# Patient Record
Sex: MALE | Race: WHITE | Employment: UNEMPLOYED | URBAN - METROPOLITAN AREA
[De-identification: names, ages, dates, MRNs, and addresses within clinical notes are randomized per-mention and may not be internally consistent; named-entity substitution may affect disease eponyms.]

---

## 2017-03-08 ENCOUNTER — OFFICE VISIT (OUTPATIENT)
Dept: ORTHOPEDIC SURGERY | Age: 32
End: 2017-03-08

## 2017-03-08 VITALS — WEIGHT: 180 LBS | BODY MASS INDEX: 24.38 KG/M2 | HEIGHT: 72 IN

## 2017-03-08 DIAGNOSIS — M25.561 RIGHT KNEE PAIN, UNSPECIFIED CHRONICITY: Primary | ICD-10-CM

## 2017-03-08 DIAGNOSIS — S83.91XA SPRAIN OF RIGHT KNEE, UNSPECIFIED LIGAMENT, INITIAL ENCOUNTER: ICD-10-CM

## 2017-03-08 PROCEDURE — 99203 OFFICE O/P NEW LOW 30 MIN: CPT | Performed by: NURSE PRACTITIONER

## 2017-03-08 PROCEDURE — 73560 X-RAY EXAM OF KNEE 1 OR 2: CPT | Performed by: NURSE PRACTITIONER

## 2021-01-21 ENCOUNTER — HOSPITAL ENCOUNTER (EMERGENCY)
Age: 36
Discharge: HOME | End: 2021-01-21
Attending: EMERGENCY MEDICINE
Payer: OTHER GOVERNMENT

## 2021-01-21 ENCOUNTER — APPOINTMENT (OUTPATIENT)
Dept: CT IMAGING | Age: 36
End: 2021-01-21
Payer: OTHER GOVERNMENT

## 2021-01-21 VITALS
BODY MASS INDEX: 25.73 KG/M2 | HEIGHT: 72 IN | OXYGEN SATURATION: 97 % | RESPIRATION RATE: 14 BRPM | SYSTOLIC BLOOD PRESSURE: 138 MMHG | HEART RATE: 88 BPM | WEIGHT: 190 LBS | DIASTOLIC BLOOD PRESSURE: 84 MMHG | TEMPERATURE: 98.4 F

## 2021-01-21 DIAGNOSIS — N13.9 OBSTRUCTIVE UROPATHY: Primary | ICD-10-CM

## 2021-01-21 LAB
ALBUMIN SERPL-MCNC: 4.7 G/DL (ref 3.4–5)
ALP BLD-CCNC: 70 U/L (ref 40–129)
ALT SERPL-CCNC: 23 U/L (ref 10–40)
ANION GAP SERPL CALCULATED.3IONS-SCNC: 15 MMOL/L (ref 3–16)
AST SERPL-CCNC: 26 U/L (ref 15–37)
BASOPHILS ABSOLUTE: 0.1 K/UL (ref 0–0.2)
BASOPHILS RELATIVE PERCENT: 0.4 %
BILIRUB SERPL-MCNC: 0.4 MG/DL (ref 0–1)
BILIRUBIN DIRECT: <0.2 MG/DL (ref 0–0.3)
BILIRUBIN URINE: NEGATIVE
BILIRUBIN, INDIRECT: NORMAL MG/DL (ref 0–1)
BLOOD, URINE: ABNORMAL
BUN BLDV-MCNC: 18 MG/DL (ref 7–20)
CALCIUM SERPL-MCNC: 9.6 MG/DL (ref 8.3–10.6)
CHLORIDE BLD-SCNC: 103 MMOL/L (ref 99–110)
CLARITY: CLEAR
CO2: 20 MMOL/L (ref 21–32)
COLOR: YELLOW
CREAT SERPL-MCNC: 1.2 MG/DL (ref 0.9–1.3)
EOSINOPHILS ABSOLUTE: 0.3 K/UL (ref 0–0.6)
EOSINOPHILS RELATIVE PERCENT: 2.2 %
EPITHELIAL CELLS, UA: 1 /HPF (ref 0–5)
GFR AFRICAN AMERICAN: >60
GFR NON-AFRICAN AMERICAN: >60
GLUCOSE BLD-MCNC: 124 MG/DL (ref 70–99)
GLUCOSE URINE: NEGATIVE MG/DL
HCT VFR BLD CALC: 45.9 % (ref 40.5–52.5)
HEMOGLOBIN: 15.5 G/DL (ref 13.5–17.5)
HYALINE CASTS: 0 /LPF (ref 0–8)
KETONES, URINE: NEGATIVE MG/DL
LEUKOCYTE ESTERASE, URINE: NEGATIVE
LIPASE: 41 U/L (ref 13–60)
LYMPHOCYTES ABSOLUTE: 2.3 K/UL (ref 1–5.1)
LYMPHOCYTES RELATIVE PERCENT: 17.4 %
MCH RBC QN AUTO: 28.6 PG (ref 26–34)
MCHC RBC AUTO-ENTMCNC: 33.8 G/DL (ref 31–36)
MCV RBC AUTO: 84.7 FL (ref 80–100)
MICROSCOPIC EXAMINATION: YES
MONOCYTES ABSOLUTE: 0.8 K/UL (ref 0–1.3)
MONOCYTES RELATIVE PERCENT: 6.3 %
NEUTROPHILS ABSOLUTE: 9.8 K/UL (ref 1.7–7.7)
NEUTROPHILS RELATIVE PERCENT: 73.7 %
NITRITE, URINE: NEGATIVE
PDW BLD-RTO: 13.9 % (ref 12.4–15.4)
PH UA: 7.5 (ref 5–8)
PLATELET # BLD: 214 K/UL (ref 135–450)
PMV BLD AUTO: 9 FL (ref 5–10.5)
POTASSIUM REFLEX MAGNESIUM: 4 MMOL/L (ref 3.5–5.1)
PROTEIN UA: NEGATIVE MG/DL
RBC # BLD: 5.43 M/UL (ref 4.2–5.9)
RBC UA: 83 /HPF (ref 0–4)
SODIUM BLD-SCNC: 138 MMOL/L (ref 136–145)
SPECIFIC GRAVITY UA: 1.02 (ref 1–1.03)
TOTAL PROTEIN: 7.4 G/DL (ref 6.4–8.2)
URINE REFLEX TO CULTURE: ABNORMAL
URINE TYPE: ABNORMAL
UROBILINOGEN, URINE: 0.2 E.U./DL
WBC # BLD: 13.3 K/UL (ref 4–11)
WBC UA: 1 /HPF (ref 0–5)

## 2021-01-21 PROCEDURE — 99285 EMERGENCY DEPT VISIT HI MDM: CPT

## 2021-01-21 PROCEDURE — 74176 CT ABD & PELVIS W/O CONTRAST: CPT

## 2021-01-21 PROCEDURE — 2580000003 HC RX 258: Performed by: EMERGENCY MEDICINE

## 2021-01-21 PROCEDURE — 6370000000 HC RX 637 (ALT 250 FOR IP): Performed by: EMERGENCY MEDICINE

## 2021-01-21 PROCEDURE — 80048 BASIC METABOLIC PNL TOTAL CA: CPT

## 2021-01-21 PROCEDURE — 6360000002 HC RX W HCPCS: Performed by: EMERGENCY MEDICINE

## 2021-01-21 PROCEDURE — 96374 THER/PROPH/DIAG INJ IV PUSH: CPT

## 2021-01-21 PROCEDURE — 83690 ASSAY OF LIPASE: CPT

## 2021-01-21 PROCEDURE — 81001 URINALYSIS AUTO W/SCOPE: CPT

## 2021-01-21 PROCEDURE — 96375 TX/PRO/DX INJ NEW DRUG ADDON: CPT

## 2021-01-21 PROCEDURE — 80076 HEPATIC FUNCTION PANEL: CPT

## 2021-01-21 PROCEDURE — 96376 TX/PRO/DX INJ SAME DRUG ADON: CPT

## 2021-01-21 PROCEDURE — 85025 COMPLETE CBC W/AUTO DIFF WBC: CPT

## 2021-01-21 RX ORDER — MORPHINE SULFATE 2 MG/ML
2 INJECTION, SOLUTION INTRAMUSCULAR; INTRAVENOUS ONCE
Status: COMPLETED | OUTPATIENT
Start: 2021-01-21 | End: 2021-01-21

## 2021-01-21 RX ORDER — 0.9 % SODIUM CHLORIDE 0.9 %
1000 INTRAVENOUS SOLUTION INTRAVENOUS ONCE
Status: COMPLETED | OUTPATIENT
Start: 2021-01-21 | End: 2021-01-21

## 2021-01-21 RX ORDER — TAMSULOSIN HYDROCHLORIDE 0.4 MG/1
0.4 CAPSULE ORAL DAILY
Qty: 10 CAPSULE | Refills: 0 | Status: SHIPPED | OUTPATIENT
Start: 2021-01-21 | End: 2021-01-31

## 2021-01-21 RX ORDER — ONDANSETRON 2 MG/ML
4 INJECTION INTRAMUSCULAR; INTRAVENOUS ONCE
Status: COMPLETED | OUTPATIENT
Start: 2021-01-21 | End: 2021-01-21

## 2021-01-21 RX ORDER — MORPHINE SULFATE 4 MG/ML
4 INJECTION, SOLUTION INTRAMUSCULAR; INTRAVENOUS ONCE
Status: COMPLETED | OUTPATIENT
Start: 2021-01-21 | End: 2021-01-21

## 2021-01-21 RX ORDER — TAMSULOSIN HYDROCHLORIDE 0.4 MG/1
0.4 CAPSULE ORAL ONCE
Status: COMPLETED | OUTPATIENT
Start: 2021-01-21 | End: 2021-01-21

## 2021-01-21 RX ORDER — HYDROCODONE BITARTRATE AND ACETAMINOPHEN 5; 325 MG/1; MG/1
1 TABLET ORAL EVERY 6 HOURS PRN
Qty: 12 TABLET | Refills: 0 | Status: SHIPPED | OUTPATIENT
Start: 2021-01-21 | End: 2021-01-24

## 2021-01-21 RX ADMIN — HYDROMORPHONE HYDROCHLORIDE 0.5 MG: 1 INJECTION, SOLUTION INTRAMUSCULAR; INTRAVENOUS; SUBCUTANEOUS at 04:59

## 2021-01-21 RX ADMIN — TAMSULOSIN HYDROCHLORIDE 0.4 MG: 0.4 CAPSULE ORAL at 06:05

## 2021-01-21 RX ADMIN — ONDANSETRON 4 MG: 2 INJECTION INTRAMUSCULAR; INTRAVENOUS at 04:17

## 2021-01-21 RX ADMIN — SODIUM CHLORIDE 1000 ML: 9 INJECTION, SOLUTION INTRAVENOUS at 04:17

## 2021-01-21 RX ADMIN — MORPHINE SULFATE 4 MG: 4 INJECTION, SOLUTION INTRAMUSCULAR; INTRAVENOUS at 04:17

## 2021-01-21 RX ADMIN — MORPHINE SULFATE 2 MG: 2 INJECTION, SOLUTION INTRAMUSCULAR; INTRAVENOUS at 06:17

## 2021-01-21 ASSESSMENT — ENCOUNTER SYMPTOMS
WHEEZING: 0
COLOR CHANGE: 0
COUGH: 0
STRIDOR: 0
SHORTNESS OF BREATH: 0
VOMITING: 1
EYE ITCHING: 0
PHOTOPHOBIA: 0
DIARRHEA: 0
CONSTIPATION: 0
APNEA: 0
BACK PAIN: 0
CHEST TIGHTNESS: 0
BLOOD IN STOOL: 0
ABDOMINAL DISTENTION: 0
RECTAL PAIN: 0
EYE REDNESS: 0
CHOKING: 0
EYE PAIN: 0
ABDOMINAL PAIN: 1
NAUSEA: 1
ANAL BLEEDING: 0
EYE DISCHARGE: 0

## 2021-01-21 ASSESSMENT — PAIN SCALES - GENERAL
PAINLEVEL_OUTOF10: 8
PAINLEVEL_OUTOF10: 3
PAINLEVEL_OUTOF10: 10

## 2021-01-21 ASSESSMENT — PAIN DESCRIPTION - DESCRIPTORS: DESCRIPTORS: STABBING

## 2021-01-21 ASSESSMENT — PAIN DESCRIPTION - LOCATION: LOCATION: ABDOMEN

## 2021-01-21 NOTE — ED NOTES
Discharge and education instructions reviewed. Patient verbalized understanding, teach-back successful. Patient denied questions at this time. No acute distress noted. Patient instructed to follow-up as noted - return to emergency department if symptoms worsen. Patient verbalized understanding. Discharged per EDMD with discharge instructions.         Anne Graham RN  01/21/21 6732

## 2021-01-21 NOTE — ED PROVIDER NOTES
bruise/bleed easily. Psychiatric/Behavioral: Negative for agitation, behavioral problems, confusion and decreased concentration. Except as noted above the remainder of the review of systems was reviewed and negative. PAST MEDICAL HISTORY     Past Medical History:   Diagnosis Date    Paralysis ([address]     left foot    RSD (reflex sympathetic dystrophy)        SURGICAL HISTORY       Past Surgical History:   Procedure Laterality Date    ANTERIOR CRUCIATE LIGAMENT REPAIR      KNEE CARTILAGE SURGERY         CURRENT MEDICATIONS       Previous Medications    ACETAMINOPHEN (AMINOFEN) 325 MG TABLET    Take 2 tablets by mouth every 6 hours as needed for Pain    ASPIRIN 81 MG TABLET    Take 81 mg by mouth daily    GABAPENTIN (NEURONTIN) 600 MG TABLET    Take 600 mg by mouth 3 times daily 2 tabs in am, 1 tab in afternoon and 2 tabs at night    INDOMETHACIN (INDOCIN) 25 MG CAPSULE    Take 25 mg by mouth 2 times daily (with meals)    PROMETHAZINE (PHENERGAN) 25 MG TABLET    Take 25 mg by mouth every 6 hours as needed for Nausea       ALLERGIES     Naprosyn [naproxen]    FAMILY HISTORY      History reviewed. No pertinent family history.     SOCIAL HISTORY       Social History     Socioeconomic History    Marital status:      Spouse name: None    Number of children: None    Years of education: None    Highest education level: None   Occupational History    None   Social Needs    Financial resource strain: None    Food insecurity     Worry: None     Inability: None    Transportation needs     Medical: None     Non-medical: None   Tobacco Use    Smoking status: Former Smoker    Smokeless tobacco: Never Used   Substance and Sexual Activity    Alcohol use: Yes     Comment: occasional    Drug use: No    Sexual activity: None   Lifestyle    Physical activity     Days per week: None     Minutes per session: None    Stress: None   Relationships    Social connections     Talks on phone: None     Gets together: None     Attends Islam service: None     Active member of club or organization: None     Attends meetings of clubs or organizations: None     Relationship status: None    Intimate partner violence     Fear of current or ex partner: None     Emotionally abused: None     Physically abused: None     Forced sexual activity: None   Other Topics Concern    None   Social History Narrative    None       PHYSICAL EXAM       ED Triage Vitals   BP Temp Temp Source Pulse Resp SpO2 Height Weight   01/21/21 0437 01/21/21 0408 01/21/21 0408 01/21/21 0408 01/21/21 0408 01/21/21 0408 01/21/21 0408 01/21/21 0408   139/88 98.1 °F (36.7 °C) Oral 77 28 100 % 6' (1.829 m) 190 lb (86.2 kg)       Physical Exam  Vitals signs and nursing note reviewed. Constitutional:       Appearance: He is well-developed. He is ill-appearing. He is not diaphoretic. HENT:      Head: Normocephalic and atraumatic. Eyes:      General:         Right eye: No discharge. Left eye: No discharge. Pupils: Pupils are equal, round, and reactive to light. Neck:      Musculoskeletal: Normal range of motion. Thyroid: No thyromegaly. Trachea: No tracheal deviation. Cardiovascular:      Rate and Rhythm: Normal rate and regular rhythm. Heart sounds: No murmur. Pulmonary:      Breath sounds: No wheezing or rales. Chest:      Chest wall: No tenderness. Abdominal:      General: There is no distension. Palpations: Abdomen is soft. There is no mass. Tenderness: There is abdominal tenderness. There is no guarding or rebound. Musculoskeletal: Normal range of motion. General: No tenderness or deformity. Skin:     General: Skin is warm. Coloration: Skin is not pale. Findings: No erythema or rash. Neurological:      Mental Status: He is alert. Cranial Nerves: No cranial nerve deficit. Motor: No abnormal muscle tone.       Coordination: Coordination normal.         DIAGNOSTIC RESULTS EKG: All EKG's are interpreted by the Emergency Department Physician who either signs or Co-signs this chart in the absence of acardiologist.    None    RADIOLOGY:   Non-plain film images such as CT, Ultrasoundand MRI are read by the radiologist. Plain radiographic images are visualized and preliminarily interpreted by the emergency physician with the below findings:       Impression   Right-sided obstructive uropathy with a small distal right ureteral calculus.      ED BEDSIDE ULTRASOUND:   Performed by ED Physician - none    LABS:  Labs Reviewed   CBC WITH AUTO DIFFERENTIAL - Abnormal; Notable for the following components:       Result Value    WBC 13.3 (*)     Neutrophils Absolute 9.8 (*)     All other components within normal limits    Narrative:     Performed at:  [address] [employer]   Phone [phone]   BASIC METABOLIC PANEL W/ REFLEX TO MG FOR LOW K - Abnormal; Notable for the following components:    CO2 20 (*)     Glucose 124 (*)     All other components within normal limits    Narrative:     Performed at:  [facility]  [address] [facility] [employer] [address]   Phone [phone]   URINE RT REFLEX TO CULTURE - Abnormal; Notable for the following components:    Blood, Urine LARGE (*)     All other components within normal limits    Narrative:     Performed at:  [facility]  [address] [facility] [address]   Phone [phone]   MICROSCOPIC URINALYSIS - Abnormal; Notable for the following components:    RBC, UA 83 (*)     All other components within normal limits    Narrative:     Performed at:  [facility]  [address], [address] [employer] [address]   Phone [phone]   LIPASE    Narrative:     Performed at:  [facility]  [address] [place] [address] [facility] [address]   Phone [phone] HEPATIC FUNCTION PANEL    Narrative:     Performed at:  [place] [address] [address] [facility] [place] [employer] [employer]   Phone [phone]       All other labs were withinnormal range or not returned as of this dictation. EMERGENCY DEPARTMENT COURSE and DIFFERENTIAL DIAGNOSIS/MDM:     PMH, Surgical Hx, FH, Social Hx reviewed by myself (ETOH usage, Tobacco usage, Drug usage reviewed by myself, no pertinent Hx)- No Pertinent Hx     Old records were reviewed by me    73-year-old with obstructive uropathy. A very small 3 mm stone at R distal ureter. Analgesics given. Fluids given. Flomax. I estimate there is LOW risk for Sepsis, MI, Stroke, Tamponade, PTX, Toxicity or other life threatening etiology thus I consider the discharge disposition reasonable. The patient is at low risk for mortality based on demographic, history and clinical factors. Given the best available information and clinical assessment, I estimate the risk of hospitalization to be greater than risk of treatment at home. I have explained to the patient that the risk could rapidly change, given precautions for return and instructions. Explained to patient that the risk for mortality is low based on demographic, history and clinical factors. I discussed with patient the results of evaluation in the ED, diagnosis, care, and prognosis. The plan is to discharge to home. Patient is in agreement with plan and questions have been answered. I also discussed with patient the reasons which may require a return visit and the importance of follow-up care. The patient is well-appearing, nontoxic, and improved at the time of discharge. Patient agrees to call to arrange follow-up care as directed. Patient understands to return immediately for worsening/change in symptoms. CRITICAL CARE TIME   Total Critical Caretime was 21 minutes, excluding separately reportable procedures.   There was a high probability of clinically significant/life threatening deterioration in the patient's condition which required my urgent intervention. PROCEDURES:  Unlessotherwise noted below, none    FINAL IMPRESSION      1. Obstructive uropathy          DISPOSITION/PLAN   DISPOSITION Discharge - Pending Orders Complete 01/21/2021 06:27:07 AM    PATIENT REFERRED TO:  Haridk Valenzuela  [address] [address] [address]  [phone]    Call today        DISCHARGE MEDICATIONS:  New Prescriptions    HYDROCODONE-ACETAMINOPHEN (NORCO) 5-325 MG PER TABLET    Take 1 tablet by mouth every 6 hours as needed for Pain for up to 3 days. Intended supply: 3 days.  Take lowest dose possible to manage pain    TAMSULOSIN (FLOMAX) 0.4 MG CAPSULE    Take 1 capsule by mouth daily for 10 days          (Please note that portions ofthis note were completed with a voice recognition program.  Efforts were made to edit the dictations but occasionally words are mis-transcribed.)    Jose Aguilar MD(electronically signed)  Attending Emergency Physician            Jose Aguilar MD  01/21/21 1795

## 2023-09-08 ENCOUNTER — HOSPITAL ENCOUNTER (EMERGENCY)
Age: 38
Discharge: HOME | End: 2023-09-08
Attending: EMERGENCY MEDICINE
Payer: OTHER GOVERNMENT

## 2023-09-08 ENCOUNTER — APPOINTMENT (OUTPATIENT)
Dept: GENERAL RADIOLOGY | Age: 38
End: 2023-09-08
Payer: OTHER GOVERNMENT

## 2023-09-08 ENCOUNTER — APPOINTMENT (OUTPATIENT)
Dept: CT IMAGING | Age: 38
End: 2023-09-08
Payer: OTHER GOVERNMENT

## 2023-09-08 VITALS
RESPIRATION RATE: 16 BRPM | HEIGHT: 72 IN | OXYGEN SATURATION: 96 % | SYSTOLIC BLOOD PRESSURE: 115 MMHG | HEART RATE: 87 BPM | DIASTOLIC BLOOD PRESSURE: 84 MMHG | TEMPERATURE: 98.3 F | WEIGHT: 199.08 LBS | BODY MASS INDEX: 26.96 KG/M2

## 2023-09-08 DIAGNOSIS — R07.89 ATYPICAL CHEST PAIN: Primary | ICD-10-CM

## 2023-09-08 LAB
ALBUMIN SERPL-MCNC: 4.2 G/DL (ref 3.4–5)
ALBUMIN/GLOB SERPL: 1.4 {RATIO} (ref 1.1–2.2)
ALP SERPL-CCNC: 72 U/L (ref 40–129)
ALT SERPL-CCNC: 24 U/L (ref 10–40)
AMPHETAMINES UR QL SCN>1000 NG/ML: NORMAL
ANION GAP SERPL CALCULATED.3IONS-SCNC: 15 MMOL/L (ref 3–16)
AST SERPL-CCNC: 31 U/L (ref 15–37)
BARBITURATES UR QL SCN>200 NG/ML: NORMAL
BASOPHILS # BLD: 0.1 K/UL (ref 0–0.2)
BASOPHILS NFR BLD: 0.7 %
BENZODIAZ UR QL SCN>200 NG/ML: NORMAL
BILIRUB SERPL-MCNC: 0.3 MG/DL (ref 0–1)
BILIRUB UR QL STRIP.AUTO: NEGATIVE
BUN SERPL-MCNC: 17 MG/DL (ref 7–20)
CALCIUM SERPL-MCNC: 9.5 MG/DL (ref 8.3–10.6)
CANNABINOIDS UR QL SCN>50 NG/ML: NORMAL
CHLORIDE SERPL-SCNC: 106 MMOL/L (ref 99–110)
CHP ED QC CHECK: NORMAL
CLARITY UR: CLEAR
CO2 SERPL-SCNC: 17 MMOL/L (ref 21–32)
COCAINE UR QL SCN: NORMAL
COLOR UR: YELLOW
CREAT SERPL-MCNC: 0.8 MG/DL (ref 0.9–1.3)
D DIMER: 0.32 UG/ML FEU (ref 0–0.6)
DEPRECATED RDW RBC AUTO: 13.5 % (ref 12.4–15.4)
DRUG SCREEN COMMENT UR-IMP: NORMAL
EOSINOPHIL # BLD: 0.3 K/UL (ref 0–0.6)
EOSINOPHIL NFR BLD: 4.4 %
ETHANOLAMINE SERPL-MCNC: NORMAL MG/DL (ref 0–0.08)
FENTANYL SCREEN, URINE: NORMAL
GFR SERPLBLD CREATININE-BSD FMLA CKD-EPI: >60 ML/MIN/{1.73_M2}
GLUCOSE BLD-MCNC: 117 MG/DL
GLUCOSE BLD-MCNC: 117 MG/DL (ref 70–99)
GLUCOSE SERPL-MCNC: 131 MG/DL (ref 70–99)
GLUCOSE UR STRIP.AUTO-MCNC: NEGATIVE MG/DL
HCT VFR BLD AUTO: 43.3 % (ref 40.5–52.5)
HGB BLD-MCNC: 15.3 G/DL (ref 13.5–17.5)
HGB UR QL STRIP.AUTO: NEGATIVE
KETONES UR STRIP.AUTO-MCNC: NEGATIVE MG/DL
LEUKOCYTE ESTERASE UR QL STRIP.AUTO: NEGATIVE
LIPASE SERPL-CCNC: 52 U/L (ref 13–60)
LYMPHOCYTES # BLD: 2.5 K/UL (ref 1–5.1)
LYMPHOCYTES NFR BLD: 35 %
MCH RBC QN AUTO: 29.1 PG (ref 26–34)
MCHC RBC AUTO-ENTMCNC: 35.5 G/DL (ref 31–36)
MCV RBC AUTO: 82 FL (ref 80–100)
METHADONE UR QL SCN>300 NG/ML: NORMAL
MONOCYTES # BLD: 0.5 K/UL (ref 0–1.3)
MONOCYTES NFR BLD: 6.8 %
NEUTROPHILS # BLD: 3.7 K/UL (ref 1.7–7.7)
NEUTROPHILS NFR BLD: 53.1 %
NITRITE UR QL STRIP.AUTO: NEGATIVE
NT-PROBNP SERPL-MCNC: <36 PG/ML (ref 0–124)
OPIATES UR QL SCN>300 NG/ML: NORMAL
OXYCODONE UR QL SCN: NORMAL
PCP UR QL SCN>25 NG/ML: NORMAL
PERFORMED ON: ABNORMAL
PH UR STRIP.AUTO: 6.5 [PH] (ref 5–8)
PH UR STRIP: 6.5 [PH]
PLATELET # BLD AUTO: 211 K/UL (ref 135–450)
PMV BLD AUTO: 8.9 FL (ref 5–10.5)
POTASSIUM SERPL-SCNC: 3.9 MMOL/L (ref 3.5–5.1)
PROT SERPL-MCNC: 7.2 G/DL (ref 6.4–8.2)
PROT UR STRIP.AUTO-MCNC: NEGATIVE MG/DL
RBC # BLD AUTO: 5.28 M/UL (ref 4.2–5.9)
SARS-COV-2 RDRP RESP QL NAA+PROBE: NOT DETECTED
SODIUM SERPL-SCNC: 138 MMOL/L (ref 136–145)
SP GR UR STRIP.AUTO: 1.02 (ref 1–1.03)
TROPONIN, HIGH SENSITIVITY: <6 NG/L (ref 0–22)
TROPONIN, HIGH SENSITIVITY: <6 NG/L (ref 0–22)
TSH SERPL DL<=0.005 MIU/L-ACNC: 1.23 UIU/ML (ref 0.27–4.2)
UA COMPLETE W REFLEX CULTURE PNL UR: NORMAL
UA DIPSTICK W REFLEX MICRO PNL UR: NORMAL
URN SPEC COLLECT METH UR: NORMAL
UROBILINOGEN UR STRIP-ACNC: 0.2 E.U./DL
WBC # BLD AUTO: 7 K/UL (ref 4–11)

## 2023-09-08 PROCEDURE — 81003 URINALYSIS AUTO W/O SCOPE: CPT

## 2023-09-08 PROCEDURE — 96375 TX/PRO/DX INJ NEW DRUG ADDON: CPT

## 2023-09-08 PROCEDURE — 6360000002 HC RX W HCPCS: Performed by: EMERGENCY MEDICINE

## 2023-09-08 PROCEDURE — 36415 COLL VENOUS BLD VENIPUNCTURE: CPT

## 2023-09-08 PROCEDURE — 70450 CT HEAD/BRAIN W/O DYE: CPT

## 2023-09-08 PROCEDURE — 80307 DRUG TEST PRSMV CHEM ANLYZR: CPT

## 2023-09-08 PROCEDURE — 71046 X-RAY EXAM CHEST 2 VIEWS: CPT

## 2023-09-08 PROCEDURE — 93005 ELECTROCARDIOGRAM TRACING: CPT | Performed by: NURSE PRACTITIONER

## 2023-09-08 PROCEDURE — 99285 EMERGENCY DEPT VISIT HI MDM: CPT

## 2023-09-08 PROCEDURE — 85379 FIBRIN DEGRADATION QUANT: CPT

## 2023-09-08 PROCEDURE — 93005 ELECTROCARDIOGRAM TRACING: CPT | Performed by: EMERGENCY MEDICINE

## 2023-09-08 PROCEDURE — 83690 ASSAY OF LIPASE: CPT

## 2023-09-08 PROCEDURE — 82077 ASSAY SPEC XCP UR&BREATH IA: CPT

## 2023-09-08 PROCEDURE — 6370000000 HC RX 637 (ALT 250 FOR IP): Performed by: NURSE PRACTITIONER

## 2023-09-08 PROCEDURE — 83880 ASSAY OF NATRIURETIC PEPTIDE: CPT

## 2023-09-08 PROCEDURE — 85025 COMPLETE CBC W/AUTO DIFF WBC: CPT

## 2023-09-08 PROCEDURE — 84443 ASSAY THYROID STIM HORMONE: CPT

## 2023-09-08 PROCEDURE — 2580000003 HC RX 258: Performed by: NURSE PRACTITIONER

## 2023-09-08 PROCEDURE — 87635 SARS-COV-2 COVID-19 AMP PRB: CPT

## 2023-09-08 PROCEDURE — 80053 COMPREHEN METABOLIC PANEL: CPT

## 2023-09-08 PROCEDURE — 96374 THER/PROPH/DIAG INJ IV PUSH: CPT

## 2023-09-08 PROCEDURE — 96361 HYDRATE IV INFUSION ADD-ON: CPT

## 2023-09-08 PROCEDURE — 84484 ASSAY OF TROPONIN QUANT: CPT

## 2023-09-08 RX ORDER — 0.9 % SODIUM CHLORIDE 0.9 %
1000 INTRAVENOUS SOLUTION INTRAVENOUS ONCE
Status: COMPLETED | OUTPATIENT
Start: 2023-09-08 | End: 2023-09-08

## 2023-09-08 RX ORDER — CYCLOBENZAPRINE HCL 10 MG
10 TABLET ORAL 3 TIMES DAILY PRN
Qty: 21 TABLET | Refills: 0 | Status: SHIPPED | OUTPATIENT
Start: 2023-09-08 | End: 2023-09-18

## 2023-09-08 RX ORDER — HYDROXYZINE PAMOATE 25 MG/1
25-50 CAPSULE ORAL 3 TIMES DAILY PRN
Qty: 30 CAPSULE | Refills: 0 | Status: SHIPPED | OUTPATIENT
Start: 2023-09-08 | End: 2023-09-22

## 2023-09-08 RX ORDER — PREDNISONE 10 MG/1
60 TABLET ORAL DAILY
Qty: 30 TABLET | Refills: 0 | Status: SHIPPED | OUTPATIENT
Start: 2023-09-08 | End: 2023-09-13

## 2023-09-08 RX ORDER — KETOROLAC TROMETHAMINE 15 MG/ML
15 INJECTION, SOLUTION INTRAMUSCULAR; INTRAVENOUS ONCE
Status: COMPLETED | OUTPATIENT
Start: 2023-09-08 | End: 2023-09-08

## 2023-09-08 RX ORDER — METHOCARBAMOL 500 MG/1
1000 TABLET, FILM COATED ORAL ONCE
Status: COMPLETED | OUTPATIENT
Start: 2023-09-08 | End: 2023-09-08

## 2023-09-08 RX ORDER — ACETAMINOPHEN 500 MG
1000 TABLET ORAL 3 TIMES DAILY PRN
Qty: 180 TABLET | Refills: 0 | Status: SHIPPED | OUTPATIENT
Start: 2023-09-08

## 2023-09-08 RX ORDER — LORAZEPAM 2 MG/ML
0.5 INJECTION INTRAMUSCULAR ONCE
Status: COMPLETED | OUTPATIENT
Start: 2023-09-08 | End: 2023-09-08

## 2023-09-08 RX ORDER — LIDOCAINE 50 MG/G
1 PATCH TOPICAL DAILY
Qty: 10 PATCH | Refills: 0 | Status: SHIPPED | OUTPATIENT
Start: 2023-09-08 | End: 2023-09-18

## 2023-09-08 RX ORDER — ACETAMINOPHEN 500 MG
1000 TABLET ORAL ONCE
Status: COMPLETED | OUTPATIENT
Start: 2023-09-08 | End: 2023-09-08

## 2023-09-08 RX ADMIN — METHOCARBAMOL 1000 MG: 500 TABLET ORAL at 17:04

## 2023-09-08 RX ADMIN — ACETAMINOPHEN 1000 MG: 500 TABLET ORAL at 17:04

## 2023-09-08 RX ADMIN — KETOROLAC TROMETHAMINE 15 MG: 15 INJECTION, SOLUTION INTRAMUSCULAR; INTRAVENOUS at 18:44

## 2023-09-08 RX ADMIN — LORAZEPAM 0.5 MG: 2 INJECTION INTRAMUSCULAR; INTRAVENOUS at 18:44

## 2023-09-08 RX ADMIN — SODIUM CHLORIDE 1000 ML: 9 INJECTION, SOLUTION INTRAVENOUS at 16:17

## 2023-09-08 ASSESSMENT — PAIN SCALES - GENERAL
PAINLEVEL_OUTOF10: 7
PAINLEVEL_OUTOF10: 5

## 2023-09-08 ASSESSMENT — LIFESTYLE VARIABLES
HOW OFTEN DO YOU HAVE A DRINK CONTAINING ALCOHOL: MONTHLY OR LESS
HOW MANY STANDARD DRINKS CONTAINING ALCOHOL DO YOU HAVE ON A TYPICAL DAY: 1 OR 2

## 2023-09-08 ASSESSMENT — ENCOUNTER SYMPTOMS
ABDOMINAL PAIN: 0
COUGH: 0
VOMITING: 0
SORE THROAT: 0
BACK PAIN: 0
DIARRHEA: 0
SHORTNESS OF BREATH: 0
EYE PAIN: 0
NAUSEA: 0

## 2023-09-08 ASSESSMENT — HEART SCORE: ECG: 0
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
## 2023-09-08 NOTE — ED PROVIDER NOTES
Emergency Department Attending Provider Note  Location: [address]  9/8/2023   Note Started: 4:17 PM EDT 9/8/23       Patient Identification  Ayan Temple is a 41 y.o. male      HPI:Colt Santiago was evaluated in the Emergency Department for pain. Onset at rest about 2 hours ago. Shortness of breath. Patient states he had a heart attack in February. He was treated at the [place]. Apparently he did have a negative CTA of his coronary arteries as well as a negative cardiac catheterization. He is a non-smoker. He does suffer from posttraumatic stress disorder and reflex sympathetic dystrophy. He had a syncopal event at home. Paramedics gave 3 serial nitroglycerin. Patient states it caused improvement however just very shortly after that he told me that the pain did never quite gone away. Although initial history and physical exam information was obtained by MAGDY/NPP/MD/DO (who also dictated a record of this visit), I personally saw the patient and performed a substantive portion of the visit including all aspects of the medical decision making. has a past medical history of Paralysis (HCC) and RSD (reflex sympathetic dystrophy). PHYSICAL EXAM:    Physical Exam  Constitutional:       Appearance: He is well-developed. He is not diaphoretic. HENT:      Head: Normocephalic and atraumatic. Right Ear: External ear normal.      Left Ear: External ear normal.   Eyes:      General: No scleral icterus. Right eye: No discharge. Left eye: No discharge. Neck:      Thyroid: No thyromegaly. Vascular: No JVD. Trachea: No tracheal deviation. Cardiovascular:      Rate and Rhythm: Normal rate and regular rhythm. Heart sounds: Normal heart sounds. No murmur heard. No friction rub. No gallop. Pulmonary:      Effort: Pulmonary effort is normal. No respiratory distress. Breath sounds: Normal breath sounds. No stridor.  No wheezing or

## 2023-09-09 LAB
EKG ATRIAL RATE: 111 BPM
EKG ATRIAL RATE: 90 BPM
EKG DIAGNOSIS: NORMAL
EKG DIAGNOSIS: NORMAL
EKG P AXIS: 31 DEGREES
EKG P AXIS: 40 DEGREES
EKG P-R INTERVAL: 152 MS
EKG P-R INTERVAL: 152 MS
EKG Q-T INTERVAL: 344 MS
EKG Q-T INTERVAL: 376 MS
EKG QRS DURATION: 98 MS
EKG QRS DURATION: 98 MS
EKG QTC CALCULATION (BAZETT): 459 MS
EKG QTC CALCULATION (BAZETT): 467 MS
EKG R AXIS: -32 DEGREES
EKG R AXIS: -38 DEGREES
EKG T AXIS: 11 DEGREES
EKG T AXIS: 57 DEGREES
EKG VENTRICULAR RATE: 111 BPM
EKG VENTRICULAR RATE: 90 BPM

## 2023-09-09 PROCEDURE — 93010 ELECTROCARDIOGRAM REPORT: CPT | Performed by: INTERNAL MEDICINE

## 2024-05-06 ENCOUNTER — APPOINTMENT (OUTPATIENT)
Dept: MRI IMAGING | Age: 39
End: 2024-05-06
Payer: OTHER MISCELLANEOUS

## 2024-05-06 ENCOUNTER — APPOINTMENT (OUTPATIENT)
Dept: CT IMAGING | Age: 39
End: 2024-05-06
Payer: OTHER MISCELLANEOUS

## 2024-05-06 ENCOUNTER — APPOINTMENT (OUTPATIENT)
Dept: GENERAL RADIOLOGY | Age: 39
End: 2024-05-06
Payer: OTHER MISCELLANEOUS

## 2024-05-06 ENCOUNTER — HOSPITAL ENCOUNTER (EMERGENCY)
Age: 39
Discharge: HOME | End: 2024-05-06
Attending: STUDENT IN AN ORGANIZED HEALTH CARE EDUCATION/TRAINING PROGRAM
Payer: OTHER MISCELLANEOUS

## 2024-05-06 VITALS
HEART RATE: 95 BPM | RESPIRATION RATE: 16 BRPM | OXYGEN SATURATION: 98 % | WEIGHT: 201.5 LBS | TEMPERATURE: 98.3 F | SYSTOLIC BLOOD PRESSURE: 117 MMHG | DIASTOLIC BLOOD PRESSURE: 80 MMHG | BODY MASS INDEX: 27.29 KG/M2 | HEIGHT: 72 IN

## 2024-05-06 DIAGNOSIS — M54.2 NECK PAIN: ICD-10-CM
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
## 2024-05-06 DIAGNOSIS — S16.1XXA STRAIN OF NECK MUSCLE, INITIAL ENCOUNTER: ICD-10-CM

## 2024-05-06 DIAGNOSIS — R20.0 LEFT ARM NUMBNESS: ICD-10-CM

## 2024-05-06 LAB
ANION GAP SERPL CALCULATED.3IONS-SCNC: 10 MMOL/L (ref 3–16)
BASOPHILS # BLD: 0 K/UL (ref 0–0.2)
BASOPHILS NFR BLD: 0.1 %
BUN SERPL-MCNC: 20 MG/DL (ref 7–20)
CALCIUM SERPL-MCNC: 9.3 MG/DL (ref 8.3–10.6)
CHLORIDE SERPL-SCNC: 107 MMOL/L (ref 99–110)
CO2 SERPL-SCNC: 20 MMOL/L (ref 21–32)
CREAT SERPL-MCNC: 0.8 MG/DL (ref 0.9–1.3)
DEPRECATED RDW RBC AUTO: 14.2 % (ref 12.4–15.4)
EOSINOPHIL # BLD: 0.3 K/UL (ref 0–0.6)
EOSINOPHIL NFR BLD: 3.9 %
GFR SERPLBLD CREATININE-BSD FMLA CKD-EPI: >90 ML/MIN/{1.73_M2}
GLUCOSE SERPL-MCNC: 97 MG/DL (ref 70–99)
HCT VFR BLD AUTO: 43.8 % (ref 40.5–52.5)
HGB BLD-MCNC: 15.2 G/DL (ref 13.5–17.5)
LYMPHOCYTES # BLD: 1.2 K/UL (ref 1–5.1)
LYMPHOCYTES NFR BLD: 16.6 %
MCH RBC QN AUTO: 28.8 PG (ref 26–34)
MCHC RBC AUTO-ENTMCNC: 34.6 G/DL (ref 31–36)
MCV RBC AUTO: 83 FL (ref 80–100)
MONOCYTES # BLD: 0.6 K/UL (ref 0–1.3)
MONOCYTES NFR BLD: 8.9 %
NEUTROPHILS # BLD: 4.9 K/UL (ref 1.7–7.7)
NEUTROPHILS NFR BLD: 70.5 %
PLATELET # BLD AUTO: 197 K/UL (ref 135–450)
PMV BLD AUTO: 8.5 FL (ref 5–10.5)
POTASSIUM SERPL-SCNC: 4.9 MMOL/L (ref 3.5–5.1)
RBC # BLD AUTO: 5.28 M/UL (ref 4.2–5.9)
SODIUM SERPL-SCNC: 137 MMOL/L (ref 136–145)
WBC # BLD AUTO: 6.9 K/UL (ref 4–11)

## 2024-05-06 PROCEDURE — 6360000002 HC RX W HCPCS: Performed by: STUDENT IN AN ORGANIZED HEALTH CARE EDUCATION/TRAINING PROGRAM

## 2024-05-06 PROCEDURE — 71250 CT THORAX DX C-: CPT

## 2024-05-06 PROCEDURE — 6370000000 HC RX 637 (ALT 250 FOR IP): Performed by: EMERGENCY MEDICINE

## 2024-05-06 PROCEDURE — 96374 THER/PROPH/DIAG INJ IV PUSH: CPT

## 2024-05-06 PROCEDURE — 73060 X-RAY EXAM OF HUMERUS: CPT

## 2024-05-06 PROCEDURE — 72125 CT NECK SPINE W/O DYE: CPT

## 2024-05-06 PROCEDURE — 76376 3D RENDER W/INTRP POSTPROCES: CPT

## 2024-05-06 PROCEDURE — 85025 COMPLETE CBC W/AUTO DIFF WBC: CPT

## 2024-05-06 PROCEDURE — 99284 EMERGENCY DEPT VISIT MOD MDM: CPT

## 2024-05-06 PROCEDURE — 73030 X-RAY EXAM OF SHOULDER: CPT

## 2024-05-06 PROCEDURE — 96376 TX/PRO/DX INJ SAME DRUG ADON: CPT

## 2024-05-06 PROCEDURE — 72141 MRI NECK SPINE W/O DYE: CPT

## 2024-05-06 PROCEDURE — 80048 BASIC METABOLIC PNL TOTAL CA: CPT

## 2024-05-06 PROCEDURE — 6370000000 HC RX 637 (ALT 250 FOR IP): Performed by: STUDENT IN AN ORGANIZED HEALTH CARE EDUCATION/TRAINING PROGRAM

## 2024-05-06 PROCEDURE — 70450 CT HEAD/BRAIN W/O DYE: CPT

## 2024-05-06 PROCEDURE — 72146 MRI CHEST SPINE W/O DYE: CPT

## 2024-05-06 RX ORDER — OXYCODONE HYDROCHLORIDE 5 MG/1
5 TABLET ORAL ONCE
Status: COMPLETED | OUTPATIENT
Start: 2024-05-06 | End: 2024-05-06

## 2024-05-06 RX ORDER — LIDOCAINE 4 G/G
1 PATCH TOPICAL ONCE
Status: DISCONTINUED | OUTPATIENT
Start: 2024-05-06 | End: 2024-05-06 | Stop reason: HOSPADM

## 2024-05-06 RX ORDER — ACETAMINOPHEN 325 MG/1
650 TABLET ORAL ONCE
Status: COMPLETED | OUTPATIENT
Start: 2024-05-06 | End: 2024-05-06

## 2024-05-06 RX ORDER — HYDROMORPHONE HYDROCHLORIDE 1 MG/ML
0.5 INJECTION, SOLUTION INTRAMUSCULAR; INTRAVENOUS; SUBCUTANEOUS ONCE
Status: COMPLETED | OUTPATIENT
Start: 2024-05-06 | End: 2024-05-06

## 2024-05-06 RX ORDER — ACETAMINOPHEN 500 MG
1000 TABLET ORAL
Status: COMPLETED | OUTPATIENT
Start: 2024-05-06 | End: 2024-05-06

## 2024-05-06 RX ORDER — METHOCARBAMOL 500 MG/1
750 TABLET, FILM COATED ORAL ONCE
Status: COMPLETED | OUTPATIENT
Start: 2024-05-06 | End: 2024-05-06

## 2024-05-06 RX ORDER — OXYCODONE HYDROCHLORIDE 5 MG/1
5 TABLET ORAL EVERY 6 HOURS PRN
Qty: 8 TABLET | Refills: 0 | Status: SHIPPED | OUTPATIENT
Start: 2024-05-06 | End: 2024-05-06

## 2024-05-06 RX ORDER — HYDROMORPHONE HYDROCHLORIDE 1 MG/ML
1 INJECTION, SOLUTION INTRAMUSCULAR; INTRAVENOUS; SUBCUTANEOUS
Status: COMPLETED | OUTPATIENT
Start: 2024-05-06 | End: 2024-05-06

## 2024-05-06 RX ORDER — GABAPENTIN 300 MG/1
600 CAPSULE ORAL ONCE
Status: COMPLETED | OUTPATIENT
Start: 2024-05-06 | End: 2024-05-06

## 2024-05-06 RX ORDER — OXYCODONE HYDROCHLORIDE 5 MG/1
10 TABLET ORAL ONCE
Status: COMPLETED | OUTPATIENT
Start: 2024-05-06 | End: 2024-05-06

## 2024-05-06 RX ORDER — OXYCODONE HYDROCHLORIDE 5 MG/1
5 TABLET ORAL EVERY 6 HOURS PRN
Qty: 8 TABLET | Refills: 0 | Status: SHIPPED | OUTPATIENT
Start: 2024-05-06 | End: 2024-05-08

## 2024-05-06 RX ADMIN — ACETAMINOPHEN 1000 MG: 500 TABLET ORAL at 08:45

## 2024-05-06 RX ADMIN — OXYCODONE HYDROCHLORIDE 10 MG: 5 TABLET ORAL at 14:24

## 2024-05-06 RX ADMIN — METHOCARBAMOL TABLETS 750 MG: 500 TABLET, COATED ORAL at 08:45

## 2024-05-06 RX ADMIN — OXYCODONE HYDROCHLORIDE 5 MG: 5 TABLET ORAL at 08:45

## 2024-05-06 RX ADMIN — METHOCARBAMOL 750 MG: 500 TABLET ORAL at 14:23

## 2024-05-06 RX ADMIN — GABAPENTIN 600 MG: 300 CAPSULE ORAL at 11:51

## 2024-05-06 RX ADMIN — HYDROMORPHONE HYDROCHLORIDE 0.5 MG: 1 INJECTION, SOLUTION INTRAMUSCULAR; INTRAVENOUS; SUBCUTANEOUS at 10:18

## 2024-05-06 RX ADMIN — ACETAMINOPHEN 650 MG: 325 TABLET ORAL at 14:24

## 2024-05-06 RX ADMIN — OXYCODONE HYDROCHLORIDE 5 MG: 5 TABLET ORAL at 19:07

## 2024-05-06 RX ADMIN — HYDROMORPHONE HYDROCHLORIDE 1 MG: 1 INJECTION, SOLUTION INTRAMUSCULAR; INTRAVENOUS; SUBCUTANEOUS at 11:15

## 2024-05-06 SDOH — ECONOMIC STABILITY: FOOD INSECURITY: WITHIN THE PAST 12 MONTHS, THE FOOD YOU BOUGHT JUST DIDN'T LAST AND YOU DIDN'T HAVE MONEY TO GET MORE.: NEVER TRUE

## 2024-05-06 SDOH — ECONOMIC STABILITY: HOUSING INSECURITY
IN THE LAST 12 MONTHS, WAS THERE A TIME WHEN YOU DID NOT HAVE A STEADY PLACE TO SLEEP OR SLEPT IN A SHELTER (INCLUDING NOW)?: NO

## 2024-05-06 SDOH — ECONOMIC STABILITY: FOOD INSECURITY: WITHIN THE PAST 12 MONTHS, YOU WORRIED THAT YOUR FOOD WOULD RUN OUT BEFORE YOU GOT MONEY TO BUY MORE.: NEVER TRUE

## 2024-05-06 SDOH — ECONOMIC STABILITY: INCOME INSECURITY: IN THE LAST 12 MONTHS, WAS THERE A TIME WHEN YOU WERE NOT ABLE TO PAY THE MORTGAGE OR RENT ON TIME?: NO

## 2024-05-06 SDOH — ECONOMIC STABILITY: HOUSING INSECURITY: IN THE LAST 12 MONTHS, HOW MANY PLACES HAVE YOU LIVED?: 1

## 2024-05-06 SDOH — ECONOMIC STABILITY: TRANSPORTATION INSECURITY
IN THE PAST 12 MONTHS, HAS THE LACK OF TRANSPORTATION KEPT YOU FROM MEDICAL APPOINTMENTS OR FROM GETTING MEDICATIONS?: NO

## 2024-05-06 SDOH — ECONOMIC STABILITY: TRANSPORTATION INSECURITY
IN THE PAST 12 MONTHS, HAS LACK OF TRANSPORTATION KEPT YOU FROM MEETINGS, WORK, OR FROM GETTING THINGS NEEDED FOR DAILY LIVING?: NO

## 2024-05-06 ASSESSMENT — SOCIAL DETERMINANTS OF HEALTH (SDOH): HOW HARD IS IT FOR YOU TO PAY FOR THE VERY BASICS LIKE FOOD, HOUSING, MEDICAL CARE, AND HEATING?: NOT HARD AT ALL

## 2024-05-06 ASSESSMENT — PAIN SCALES - GENERAL
PAINLEVEL_OUTOF10: 10
PAINLEVEL_OUTOF10: 8
PAINLEVEL_OUTOF10: 10
PAINLEVEL_OUTOF10: 9
PAINLEVEL_OUTOF10: 10
PAINLEVEL_OUTOF10: 10

## 2024-05-06 ASSESSMENT — PAIN DESCRIPTION - FREQUENCY: FREQUENCY: CONTINUOUS

## 2024-05-06 ASSESSMENT — PAIN DESCRIPTION - ONSET: ONSET: SUDDEN

## 2024-05-06 ASSESSMENT — PAIN DESCRIPTION - ORIENTATION: ORIENTATION: LEFT

## 2024-05-06 ASSESSMENT — PAIN DESCRIPTION - DESCRIPTORS: DESCRIPTORS: DISCOMFORT;SHOOTING

## 2024-05-06 ASSESSMENT — PAIN DESCRIPTION - LOCATION: LOCATION: CHEST;NECK;SHOULDER

## 2024-05-06 ASSESSMENT — PAIN DESCRIPTION - PAIN TYPE: TYPE: ACUTE PAIN

## 2024-05-06 ASSESSMENT — PAIN - FUNCTIONAL ASSESSMENT: PAIN_FUNCTIONAL_ASSESSMENT: 0-10

## 2024-05-06 NOTE — ED NOTES
Pt to and from CT scan and X ray with no issues noted   Blood sent to Adolfo Fisher, RN  05/06/24 1302

## 2024-05-06 NOTE — ED NOTES
Pt updated on MRI time to patient   Patient requesting more pain medications  Md made aware.      Adolfo Haro, RN  05/06/24 5398

## 2024-05-06 NOTE — ED NOTES
[facility] member screened pt for SDOH. Pt denied resources/services.      Tiffany Agrawal  05/06/24 5815

## 2024-05-06 NOTE — ED NOTES
Patient back from MRI,   Patient placed back on spo2 monitoring      Adolfo Haro, RN  05/06/24 7936

## 2024-05-06 NOTE — ED NOTES
Spoke with MRI,   MRI checklist completed   MRI states there MRI can be done in 2-3 hours. MD made aware.      Adolfo Haro, RN  05/06/24 5534
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
## 2024-05-06 NOTE — ED NOTES
Pt requesting more pain medication, PO meds have not helped patient.   Md made aware      Adolfo Haro, RN  05/06/24 6349

## 2024-05-06 NOTE — ED NOTES
Pt did not want to wait for doctor to talk to him, patient just wanted to leave, Patient left without waiting for MD to answer all of his questions.      Adolfo Haro, RN  05/06/24 8949

## 2024-05-06 NOTE — ED NOTES
Patient does not feel comfortable going home. Patient would like to speak with MD again, Patient sates after his C collar was removed his pain increased. Pending discharge until Md speaks with MD. MD made aware.      Adolfo Haro, RN  05/06/24 1908       Adolfo Haro, RN  05/06/24 1911

## 2024-05-06 NOTE — ED NOTES
Pt complaining of difficulty breathing,   No signs of distress noted by this RN   Md at bedside      Haro, Adolfo, RN  05/06/24 4718

## 2024-05-06 NOTE — ED NOTES
Patient continues to be on spo2 monitoring   Pending MRI to be completed at this time,      Adolfo Haro, RN  05/06/24 0784